# Patient Record
Sex: FEMALE | Race: BLACK OR AFRICAN AMERICAN | NOT HISPANIC OR LATINO | URBAN - NONMETROPOLITAN AREA
[De-identification: names, ages, dates, MRNs, and addresses within clinical notes are randomized per-mention and may not be internally consistent; named-entity substitution may affect disease eponyms.]

---

## 2018-06-14 PROBLEM — H25.813: Noted: 2018-06-14

## 2018-06-14 PROBLEM — H43.813: Noted: 2018-06-14

## 2018-06-14 PROBLEM — H01.119: Noted: 2019-03-27

## 2018-06-14 PROBLEM — D86.83: Noted: 2018-06-14

## 2019-03-27 ENCOUNTER — IMPORTED ENCOUNTER (OUTPATIENT)
Dept: URBAN - NONMETROPOLITAN AREA CLINIC 1 | Facility: CLINIC | Age: 74
End: 2019-03-27

## 2019-03-27 PROCEDURE — 99212 OFFICE O/P EST SF 10 MIN: CPT

## 2019-03-27 NOTE — PATIENT DISCUSSION
Seasonal Allergies- Recommend Vasacon-A or Nasacon-A BID OU on a PRN basis. - Recommend follow-up for her routine exam in June 2019. PVD OU- Discussed diagnosis in detail with patient- Retinal exam with mainster lens today - Retina flat 360 with no breaks tears or heme. - S&S of RD/RT reviewed with pt. - Stressed that pt should contact our office right away with any changes or increase in symptoms. Cataract OU- Not yet surgical. - Reviewed symptoms of advancing cataract growth such as glare and halos and decreased vision.- Continue to monitor for now.  Pt will notify us if any new symptoms develop.; 's Notes: DFE 10/23/18

## 2019-06-18 ENCOUNTER — IMPORTED ENCOUNTER (OUTPATIENT)
Dept: URBAN - NONMETROPOLITAN AREA CLINIC 1 | Facility: CLINIC | Age: 74
End: 2019-06-18

## 2019-06-18 PROBLEM — H25.813: Noted: 2019-06-18

## 2019-06-18 PROBLEM — H01.119: Noted: 2019-06-18

## 2019-06-18 PROBLEM — D86.83: Noted: 2019-06-18

## 2019-06-18 PROBLEM — H43.813: Noted: 2019-06-18

## 2019-06-18 PROCEDURE — 92014 COMPRE OPH EXAM EST PT 1/>: CPT

## 2019-06-18 PROCEDURE — 92015 DETERMINE REFRACTIVE STATE: CPT

## 2019-06-18 NOTE — PATIENT DISCUSSION
Cataract OU- Not yet surgical. - Reviewed symptoms of advancing cataract growth such as glare and halos and decreased vision.- Continue to monitor for now. Pt will notify us if any new symptoms develop. Seasonal Allergies- Recommend Vasacon-A or Nasacon-A BID OU on a PRN basis. PVD OU- Discussed diagnosis in detail with patient- Retinal exam with mainster lens today - Retina flat 360 with no breaks tears or heme. - S&S of RD/RT reviewed with pt. - Stressed that pt should contact our office right away with any changes or increase in symptoms. Hyperopia-Discussed diagnosis with patient. Astigmatism-Discussed diagnosis with patient. Presbyopia-Discussed diagnosis with patient. Updated spec Rx given.   Recommend lens that will provide comfort as well as protect safety and health of eyes.; 's Notes: DFE 10/23/18

## 2021-03-03 NOTE — PATIENT DISCUSSION
Discussed AREDS 2 supplements, UV protection and green leafy vegetables.    REDUCE SAT FATS IN THE DIET.

## 2021-07-15 ENCOUNTER — PREPPED CHART (OUTPATIENT)
Dept: RURAL CLINIC 3 | Facility: CLINIC | Age: 76
End: 2021-07-15

## 2021-07-15 ENCOUNTER — IMPORTED ENCOUNTER (OUTPATIENT)
Dept: URBAN - NONMETROPOLITAN AREA CLINIC 1 | Facility: CLINIC | Age: 76
End: 2021-07-15

## 2021-07-15 PROBLEM — H04.123: Noted: 2021-07-15

## 2021-07-15 PROBLEM — H43.813: Noted: 2021-07-15

## 2021-07-15 PROBLEM — H25.813: Noted: 2019-06-18

## 2021-07-15 PROBLEM — H52.4: Noted: 2021-07-15

## 2021-07-15 PROBLEM — D86.83: Noted: 2021-07-15

## 2021-07-15 PROCEDURE — 92014 COMPRE OPH EXAM EST PT 1/>: CPT

## 2021-07-15 NOTE — PATIENT DISCUSSION
Combined Cataract OUDiscussed diagnosis with patient. Reviewed symptoms related to cataract progression. Discussed various treatment options with patient. Recommend cataract evaluation by Dr. Prabha Dinh. Patient elects to schedule. PVD OUDiscussed findings of exam in detail with the patient. The risk of retinal detachment in patients with PVDs was discussed with the patient and the warning signs of retinal detachment were carefully reviewed with the patient. The patient was warned to return to the office or contact the ophthalmologist on call immediately if they experience signs of retinal detachment. Continue to monitor. JANY OUDiscussed diagnosis in detail with patient. Recommend Refresh Relieva BID OU sample given today. Continue to monitor. Presbyopia OUDiscussed refractive status in detail with patient. MR done today but do not recommend updating due to cataract progression. Continue to monitor.

## 2022-03-29 ASSESSMENT — TONOMETRY
OS_IOP_MMHG: 14
OD_IOP_MMHG: 14

## 2022-03-29 ASSESSMENT — VISUAL ACUITY
OD_CC: 20/50
OS_CC: 20/20-1

## 2022-04-10 ASSESSMENT — TONOMETRY
OD_IOP_MMHG: 14
OD_IOP_MMHG: 14
OS_IOP_MMHG: 14
OD_IOP_MMHG: 14
OS_IOP_MMHG: 14
OS_IOP_MMHG: 14

## 2022-04-10 ASSESSMENT — VISUAL ACUITY
OD_SC: 20/50
OS_SC: 20/25+2
OD_SC: 20/40
OD_SC: 20/40
OS_SC: 20/20-
OD_PH: 20/25
OS_SC: 20/25+2

## 2022-06-20 ENCOUNTER — CONSULTATION/EVALUATION (OUTPATIENT)
Dept: RURAL CLINIC 3 | Facility: CLINIC | Age: 77
End: 2022-06-20

## 2022-06-20 VITALS
DIASTOLIC BLOOD PRESSURE: 69 MMHG | HEIGHT: 65 IN | SYSTOLIC BLOOD PRESSURE: 108 MMHG | WEIGHT: 180 LBS | BODY MASS INDEX: 29.99 KG/M2 | HEART RATE: 94 BPM

## 2022-06-20 DIAGNOSIS — Z01.818: ICD-10-CM

## 2022-06-20 DIAGNOSIS — H25.13: ICD-10-CM

## 2022-06-20 PROCEDURE — 99214 OFFICE O/P EST MOD 30 MIN: CPT

## 2022-06-20 ASSESSMENT — VISUAL ACUITY
OD_PAM: 20/25
OS_CC: 20/30
OS_BAT: 20/80
OD_CC: 20/70
OD_BAT: 20/100
OD_CC: 20/80+2
OD_SC: 20/100
OS_CC: 20/30
OS_AM: 20/20
OS_SC: 20/50
OD_PH: 20/30-2

## 2022-06-20 ASSESSMENT — TONOMETRY
OS_IOP_MMHG: 17
OD_IOP_MMHG: 16

## 2022-06-20 NOTE — PATIENT DISCUSSION
(Surgical) Visually Significant (secondary to glare), discussed the risks, benefits, alternatives, and limitations of cataract surgery. The patient stated a full understanding and a desire to proceed with the procedure. The patient will need to return for pre-op appointment with cataract measurements and to have any additional questions answered, and start pre-operative eye drops as directed.  Pt elects to proceed with OD first and then OS after with Stand/Trad OU & discussed need for bifocals.

## 2022-07-20 ENCOUNTER — PRE-OP/H&P (OUTPATIENT)
Dept: RURAL CLINIC 3 | Facility: CLINIC | Age: 77
End: 2022-07-20

## 2022-07-20 VITALS
WEIGHT: 180 LBS | SYSTOLIC BLOOD PRESSURE: 110 MMHG | HEART RATE: 64 BPM | DIASTOLIC BLOOD PRESSURE: 60 MMHG | HEIGHT: 65 IN | BODY MASS INDEX: 29.99 KG/M2

## 2022-07-20 DIAGNOSIS — Z01.818: ICD-10-CM

## 2022-07-20 PROCEDURE — 99499 UNLISTED E&M SERVICE: CPT

## 2022-07-25 ASSESSMENT — VISUAL ACUITY
OS_CC: 20/30
OS_CC: 20/30
OS_SC: 20/50
OD_BAT: 20/100
OD_PAM: 20/25
OD_SC: 20/100
OD_CC: 20/80+2
OD_PH: 20/30-2
OS_AM: 20/20
OS_BAT: 20/80
OD_CC: 20/70

## 2022-08-04 ENCOUNTER — SURGERY/PROCEDURE (OUTPATIENT)
Dept: RURAL CLINIC 4 | Facility: CLINIC | Age: 77
End: 2022-08-04

## 2022-08-04 ENCOUNTER — POST-OP (OUTPATIENT)
Dept: RURAL CLINIC 3 | Facility: CLINIC | Age: 77
End: 2022-08-04

## 2022-08-04 DIAGNOSIS — Z98.41: ICD-10-CM

## 2022-08-04 DIAGNOSIS — H25.11: ICD-10-CM

## 2022-08-04 PROCEDURE — 68841 INSJ RX ELUT IMPLT LAC CANAL: CPT

## 2022-08-04 PROCEDURE — 66984 XCAPSL CTRC RMVL W/O ECP: CPT

## 2022-08-04 PROCEDURE — 99024 POSTOP FOLLOW-UP VISIT: CPT

## 2022-08-04 ASSESSMENT — VISUAL ACUITY
OS_SC: 20/60-2
OD_SC: 20/200-1
OS_PH: 20/40-2

## 2022-08-04 ASSESSMENT — TONOMETRY
OD_IOP_MMHG: 14
OS_IOP_MMHG: 14

## 2022-08-09 ENCOUNTER — POST OP/EVAL OF SECOND EYE (OUTPATIENT)
Dept: RURAL CLINIC 3 | Facility: CLINIC | Age: 77
End: 2022-08-09

## 2022-08-09 DIAGNOSIS — H25.12: ICD-10-CM

## 2022-08-09 PROCEDURE — 99213 OFFICE O/P EST LOW 20 MIN: CPT

## 2022-08-09 ASSESSMENT — VISUAL ACUITY
OD_SC: 20/70
OD_PH: 20/40
OS_BAT: 20/80
OS_AM: 20/20
OS_SC: 20/50
OS_CC: 20/20-1

## 2022-08-09 ASSESSMENT — TONOMETRY
OD_IOP_MMHG: 16
OS_IOP_MMHG: 16

## 2022-09-15 ENCOUNTER — POST-OP (OUTPATIENT)
Dept: RURAL CLINIC 3 | Facility: CLINIC | Age: 77
End: 2022-09-15

## 2022-09-15 ENCOUNTER — SURGERY/PROCEDURE (OUTPATIENT)
Dept: RURAL CLINIC 4 | Facility: CLINIC | Age: 77
End: 2022-09-15

## 2022-09-15 DIAGNOSIS — H25.12: ICD-10-CM

## 2022-09-15 DIAGNOSIS — Z98.41: ICD-10-CM

## 2022-09-15 DIAGNOSIS — Z98.42: ICD-10-CM

## 2022-09-15 PROCEDURE — 99024 POSTOP FOLLOW-UP VISIT: CPT

## 2022-09-15 PROCEDURE — 68841 INSJ RX ELUT IMPLT LAC CANAL: CPT

## 2022-09-15 PROCEDURE — 66984 XCAPSL CTRC RMVL W/O ECP: CPT

## 2022-09-15 ASSESSMENT — VISUAL ACUITY
OD_SC: 20/80
OS_PH: 20/150
OD_PH: 20/30

## 2022-09-15 ASSESSMENT — TONOMETRY
OS_IOP_MMHG: 13
OD_IOP_MMHG: 15

## 2022-09-21 ENCOUNTER — POST-OP (OUTPATIENT)
Dept: RURAL CLINIC 3 | Facility: CLINIC | Age: 77
End: 2022-09-21

## 2022-09-21 DIAGNOSIS — Z98.42: ICD-10-CM

## 2022-09-21 DIAGNOSIS — Z98.41: ICD-10-CM

## 2022-09-21 PROCEDURE — 99024 POSTOP FOLLOW-UP VISIT: CPT

## 2022-09-21 ASSESSMENT — VISUAL ACUITY
OD_SC: 20/70
OS_SC: 20/90
OS_PH: 20/40
OD_PH: 20/30-1

## 2022-09-21 ASSESSMENT — TONOMETRY
OD_IOP_MMHG: 20
OS_IOP_MMHG: 21

## 2022-11-02 ENCOUNTER — POST-OP (OUTPATIENT)
Dept: RURAL CLINIC 3 | Facility: CLINIC | Age: 77
End: 2022-11-02

## 2022-11-02 DIAGNOSIS — H52.4: ICD-10-CM

## 2022-11-02 DIAGNOSIS — Z98.41: ICD-10-CM

## 2022-11-02 DIAGNOSIS — Z98.42: ICD-10-CM

## 2022-11-02 PROCEDURE — 99024 POSTOP FOLLOW-UP VISIT: CPT

## 2022-11-02 PROCEDURE — 92015 DETERMINE REFRACTIVE STATE: CPT

## 2022-11-02 ASSESSMENT — TONOMETRY
OD_IOP_MMHG: 12
OS_IOP_MMHG: 12

## 2022-11-02 ASSESSMENT — VISUAL ACUITY
OD_SC: 20/80
OD_PH: 20/25
OS_SC: 20/60
OS_PH: 20/25-2